# Patient Record
Sex: FEMALE | Race: WHITE
[De-identification: names, ages, dates, MRNs, and addresses within clinical notes are randomized per-mention and may not be internally consistent; named-entity substitution may affect disease eponyms.]

---

## 2019-10-30 ENCOUNTER — HOSPITAL ENCOUNTER (INPATIENT)
Dept: HOSPITAL 8 - ORIP | Age: 64
LOS: 2 days | Discharge: HOME | DRG: 982 | End: 2019-11-01
Attending: SURGERY | Admitting: SURGERY
Payer: MEDICARE

## 2019-10-30 VITALS — DIASTOLIC BLOOD PRESSURE: 79 MMHG | SYSTOLIC BLOOD PRESSURE: 129 MMHG

## 2019-10-30 VITALS — SYSTOLIC BLOOD PRESSURE: 116 MMHG | DIASTOLIC BLOOD PRESSURE: 56 MMHG

## 2019-10-30 VITALS — BODY MASS INDEX: 31.38 KG/M2 | WEIGHT: 159.84 LBS | HEIGHT: 60 IN

## 2019-10-30 VITALS — DIASTOLIC BLOOD PRESSURE: 59 MMHG | SYSTOLIC BLOOD PRESSURE: 122 MMHG

## 2019-10-30 DIAGNOSIS — M31.30: Primary | ICD-10-CM

## 2019-10-30 DIAGNOSIS — N17.9: ICD-10-CM

## 2019-10-30 DIAGNOSIS — K44.9: ICD-10-CM

## 2019-10-30 DIAGNOSIS — K21.9: ICD-10-CM

## 2019-10-30 LAB
ALBUMIN SERPL-MCNC: 4.4 G/DL (ref 3.4–5)
ALP SERPL-CCNC: 85 U/L (ref 45–117)
ALT SERPL-CCNC: 20 U/L (ref 12–78)
ANION GAP SERPL CALC-SCNC: 11 MMOL/L (ref 5–15)
BASOPHILS # BLD AUTO: 0.03 X10^3/UL (ref 0–0.1)
BASOPHILS NFR BLD AUTO: 0 % (ref 0–1)
BILIRUB SERPL-MCNC: 0.4 MG/DL (ref 0.2–1)
CALCIUM SERPL-MCNC: 9 MG/DL (ref 8.5–10.1)
CHLORIDE SERPL-SCNC: 107 MMOL/L (ref 98–107)
CREAT SERPL-MCNC: 1.21 MG/DL (ref 0.55–1.02)
EOSINOPHIL # BLD AUTO: 0.16 X10^3/UL (ref 0–0.4)
EOSINOPHIL NFR BLD AUTO: 2 % (ref 1–7)
ERYTHROCYTE [DISTWIDTH] IN BLOOD BY AUTOMATED COUNT: 12.7 % (ref 9.6–15.2)
LYMPHOCYTES # BLD AUTO: 1.92 X10^3/UL (ref 1–3.4)
LYMPHOCYTES NFR BLD AUTO: 21 % (ref 22–44)
MCH RBC QN AUTO: 30.3 PG (ref 27–34.8)
MCHC RBC AUTO-ENTMCNC: 33.3 G/DL (ref 32.4–35.8)
MCV RBC AUTO: 90.9 FL (ref 80–100)
MD: NO
MONOCYTES # BLD AUTO: 0.52 X10^3/UL (ref 0.2–0.8)
MONOCYTES NFR BLD AUTO: 6 % (ref 2–9)
NEUTROPHILS # BLD AUTO: 6.36 X10^3/UL (ref 1.8–6.8)
NEUTROPHILS NFR BLD AUTO: 71 % (ref 42–75)
PLATELET # BLD AUTO: 275 X10^3/UL (ref 130–400)
PMV BLD AUTO: 8.8 FL (ref 7.4–10.4)
PROT SERPL-MCNC: 8 G/DL (ref 6.4–8.2)
RBC # BLD AUTO: 4.94 X10^6/UL (ref 3.82–5.3)

## 2019-10-30 PROCEDURE — 0BBF4ZZ EXCISION OF RIGHT LOWER LUNG LOBE, PERCUTANEOUS ENDOSCOPIC APPROACH: ICD-10-PCS | Performed by: SURGERY

## 2019-10-30 PROCEDURE — 86850 RBC ANTIBODY SCREEN: CPT

## 2019-10-30 PROCEDURE — 71045 X-RAY EXAM CHEST 1 VIEW: CPT

## 2019-10-30 PROCEDURE — 88331 PATH CONSLTJ SURG 1 BLK 1SPC: CPT

## 2019-10-30 PROCEDURE — 88307 TISSUE EXAM BY PATHOLOGIST: CPT

## 2019-10-30 PROCEDURE — 80053 COMPREHEN METABOLIC PANEL: CPT

## 2019-10-30 PROCEDURE — C1729 CATH, DRAINAGE: HCPCS

## 2019-10-30 PROCEDURE — C1760 CLOSURE DEV, VASC: HCPCS

## 2019-10-30 PROCEDURE — 93005 ELECTROCARDIOGRAM TRACING: CPT

## 2019-10-30 PROCEDURE — 36415 COLL VENOUS BLD VENIPUNCTURE: CPT

## 2019-10-30 PROCEDURE — 88312 SPECIAL STAINS GROUP 1: CPT

## 2019-10-30 PROCEDURE — 86900 BLOOD TYPING SEROLOGIC ABO: CPT

## 2019-10-30 PROCEDURE — 85025 COMPLETE CBC W/AUTO DIFF WBC: CPT

## 2019-10-30 RX ADMIN — KETOROLAC TROMETHAMINE PRN MG: 30 INJECTION, SOLUTION INTRAMUSCULAR at 19:31

## 2019-10-30 RX ADMIN — INSULIN LISPRO SCH NOTE: 100 INJECTION, SOLUTION INTRAVENOUS; SUBCUTANEOUS at 19:00

## 2019-10-30 RX ADMIN — SODIUM CHLORIDE, PRESERVATIVE FREE SCH ML: 5 INJECTION INTRAVENOUS at 19:37

## 2019-10-30 RX ADMIN — ONDANSETRON PRN MG: 2 INJECTION, SOLUTION INTRAMUSCULAR; INTRAVENOUS at 20:04

## 2019-10-30 RX ADMIN — FENTANYL CITRATE PRN MCG: 50 INJECTION INTRAMUSCULAR; INTRAVENOUS at 16:27

## 2019-10-30 RX ADMIN — ACETAMINOPHEN SCH MG: 500 TABLET, COATED ORAL at 19:32

## 2019-10-30 RX ADMIN — FENTANYL CITRATE PRN MCG: 50 INJECTION INTRAMUSCULAR; INTRAVENOUS at 16:41

## 2019-10-31 VITALS — SYSTOLIC BLOOD PRESSURE: 115 MMHG | DIASTOLIC BLOOD PRESSURE: 56 MMHG

## 2019-10-31 VITALS — DIASTOLIC BLOOD PRESSURE: 64 MMHG | SYSTOLIC BLOOD PRESSURE: 114 MMHG

## 2019-10-31 VITALS — SYSTOLIC BLOOD PRESSURE: 107 MMHG | DIASTOLIC BLOOD PRESSURE: 52 MMHG

## 2019-10-31 VITALS — SYSTOLIC BLOOD PRESSURE: 120 MMHG | DIASTOLIC BLOOD PRESSURE: 46 MMHG

## 2019-10-31 RX ADMIN — INSULIN LISPRO SCH NOTE: 100 INJECTION, SOLUTION INTRAVENOUS; SUBCUTANEOUS at 08:23

## 2019-10-31 RX ADMIN — SODIUM CHLORIDE, PRESERVATIVE FREE SCH ML: 5 INJECTION INTRAVENOUS at 07:59

## 2019-10-31 RX ADMIN — DEXTROSE, SODIUM CHLORIDE, AND POTASSIUM CHLORIDE SCH MLS/HR: 5; .45; .15 INJECTION INTRAVENOUS at 10:11

## 2019-10-31 RX ADMIN — ACETAMINOPHEN SCH MG: 500 TABLET, COATED ORAL at 02:03

## 2019-10-31 RX ADMIN — OXYCODONE HYDROCHLORIDE PRN MG: 5 SOLUTION ORAL at 12:48

## 2019-10-31 RX ADMIN — OXYCODONE HYDROCHLORIDE PRN MG: 5 SOLUTION ORAL at 23:03

## 2019-10-31 RX ADMIN — ACETAMINOPHEN SCH MG: 500 TABLET, COATED ORAL at 20:15

## 2019-10-31 RX ADMIN — ONDANSETRON PRN MG: 2 INJECTION, SOLUTION INTRAMUSCULAR; INTRAVENOUS at 08:10

## 2019-10-31 RX ADMIN — INSULIN LISPRO SCH NOTE: 100 INJECTION, SOLUTION INTRAVENOUS; SUBCUTANEOUS at 03:00

## 2019-10-31 RX ADMIN — OMEPRAZOLE SCH MG: 20 CAPSULE, DELAYED RELEASE ORAL at 05:10

## 2019-10-31 RX ADMIN — SODIUM CHLORIDE, PRESERVATIVE FREE SCH ML: 5 INJECTION INTRAVENOUS at 20:16

## 2019-10-31 RX ADMIN — ACETAMINOPHEN SCH MG: 500 TABLET, COATED ORAL at 14:41

## 2019-10-31 RX ADMIN — OXYCODONE HYDROCHLORIDE PRN MG: 5 SOLUTION ORAL at 17:03

## 2019-10-31 RX ADMIN — ENOXAPARIN SODIUM SCH MG: 40 INJECTION SUBCUTANEOUS at 07:57

## 2019-10-31 RX ADMIN — ONDANSETRON PRN MG: 2 INJECTION, SOLUTION INTRAMUSCULAR; INTRAVENOUS at 23:03

## 2019-10-31 RX ADMIN — KETOROLAC TROMETHAMINE PRN MG: 30 INJECTION, SOLUTION INTRAMUSCULAR at 08:10

## 2019-10-31 RX ADMIN — ONDANSETRON PRN MG: 2 INJECTION, SOLUTION INTRAMUSCULAR; INTRAVENOUS at 17:02

## 2019-10-31 RX ADMIN — DEXTROSE, SODIUM CHLORIDE, AND POTASSIUM CHLORIDE SCH MLS/HR: 5; .45; .15 INJECTION INTRAVENOUS at 22:59

## 2019-10-31 RX ADMIN — ACETAMINOPHEN SCH MG: 500 TABLET, COATED ORAL at 07:58

## 2019-11-01 VITALS — DIASTOLIC BLOOD PRESSURE: 51 MMHG | SYSTOLIC BLOOD PRESSURE: 110 MMHG

## 2019-11-01 VITALS — SYSTOLIC BLOOD PRESSURE: 109 MMHG | DIASTOLIC BLOOD PRESSURE: 48 MMHG

## 2019-11-01 RX ADMIN — ENOXAPARIN SODIUM SCH MG: 40 INJECTION SUBCUTANEOUS at 08:00

## 2019-11-01 RX ADMIN — ACETAMINOPHEN SCH MG: 500 TABLET, COATED ORAL at 02:00

## 2019-11-01 RX ADMIN — OMEPRAZOLE SCH MG: 20 CAPSULE, DELAYED RELEASE ORAL at 05:05

## 2019-11-01 RX ADMIN — ACETAMINOPHEN SCH MG: 500 TABLET, COATED ORAL at 05:05

## 2019-12-16 ENCOUNTER — HOSPITAL ENCOUNTER (INPATIENT)
Dept: HOSPITAL 8 - OUT | Age: 64
LOS: 2 days | Discharge: HOME | DRG: 328 | End: 2019-12-18
Attending: SURGERY | Admitting: SURGERY
Payer: MEDICARE

## 2019-12-16 VITALS — SYSTOLIC BLOOD PRESSURE: 140 MMHG | DIASTOLIC BLOOD PRESSURE: 78 MMHG

## 2019-12-16 VITALS — BODY MASS INDEX: 32.16 KG/M2 | HEIGHT: 60 IN | WEIGHT: 163.8 LBS

## 2019-12-16 VITALS — SYSTOLIC BLOOD PRESSURE: 127 MMHG | DIASTOLIC BLOOD PRESSURE: 65 MMHG

## 2019-12-16 VITALS — DIASTOLIC BLOOD PRESSURE: 67 MMHG | SYSTOLIC BLOOD PRESSURE: 147 MMHG

## 2019-12-16 DIAGNOSIS — R13.10: ICD-10-CM

## 2019-12-16 DIAGNOSIS — E78.5: ICD-10-CM

## 2019-12-16 DIAGNOSIS — K21.9: ICD-10-CM

## 2019-12-16 DIAGNOSIS — G43.909: ICD-10-CM

## 2019-12-16 DIAGNOSIS — K44.9: Primary | ICD-10-CM

## 2019-12-16 PROCEDURE — C1781 MESH (IMPLANTABLE): HCPCS

## 2019-12-16 PROCEDURE — 0DV44ZZ RESTRICTION OF ESOPHAGOGASTRIC JUNCTION, PERCUTANEOUS ENDOSCOPIC APPROACH: ICD-10-PCS | Performed by: SURGERY

## 2019-12-16 PROCEDURE — 0BUT4JZ SUPPLEMENT DIAPHRAGM WITH SYNTHETIC SUBSTITUTE, PERCUTANEOUS ENDOSCOPIC APPROACH: ICD-10-PCS | Performed by: SURGERY

## 2019-12-16 RX ADMIN — FENTANYL CITRATE PRN MCG: 50 INJECTION INTRAMUSCULAR; INTRAVENOUS at 15:32

## 2019-12-16 RX ADMIN — FENTANYL CITRATE PRN MCG: 50 INJECTION INTRAMUSCULAR; INTRAVENOUS at 15:44

## 2019-12-16 RX ADMIN — OXYCODONE HYDROCHLORIDE PRN MG: 5 SOLUTION ORAL at 18:38

## 2019-12-16 RX ADMIN — INSULIN LISPRO SCH NOTE: 100 INJECTION, SOLUTION INTRAVENOUS; SUBCUTANEOUS at 19:23

## 2019-12-16 RX ADMIN — OXYCODONE HYDROCHLORIDE PRN MG: 5 SOLUTION ORAL at 15:48

## 2019-12-16 RX ADMIN — ATORVASTATIN CALCIUM SCH MG: 20 TABLET, FILM COATED ORAL at 21:10

## 2019-12-16 RX ADMIN — ACETAMINOPHEN SCH MG: 160 SOLUTION ORAL at 18:37

## 2019-12-16 RX ADMIN — SODIUM CHLORIDE, SODIUM LACTATE, POTASSIUM CHLORIDE, AND CALCIUM CHLORIDE SCH MLS/HR: .6; .31; .03; .02 INJECTION, SOLUTION INTRAVENOUS at 22:30

## 2019-12-17 VITALS — SYSTOLIC BLOOD PRESSURE: 141 MMHG | DIASTOLIC BLOOD PRESSURE: 79 MMHG

## 2019-12-17 VITALS — SYSTOLIC BLOOD PRESSURE: 114 MMHG | DIASTOLIC BLOOD PRESSURE: 61 MMHG

## 2019-12-17 VITALS — DIASTOLIC BLOOD PRESSURE: 78 MMHG | SYSTOLIC BLOOD PRESSURE: 144 MMHG

## 2019-12-17 VITALS — DIASTOLIC BLOOD PRESSURE: 71 MMHG | SYSTOLIC BLOOD PRESSURE: 149 MMHG

## 2019-12-17 RX ADMIN — KETOROLAC TROMETHAMINE PRN MG: 30 INJECTION, SOLUTION INTRAMUSCULAR at 17:27

## 2019-12-17 RX ADMIN — ONDANSETRON PRN MG: 2 INJECTION, SOLUTION INTRAMUSCULAR; INTRAVENOUS at 12:28

## 2019-12-17 RX ADMIN — DEXTROSE, SODIUM CHLORIDE, AND POTASSIUM CHLORIDE SCH MLS/HR: 5; .45; .15 INJECTION INTRAVENOUS at 09:46

## 2019-12-17 RX ADMIN — ACETAMINOPHEN SCH MG: 160 SOLUTION ORAL at 06:00

## 2019-12-17 RX ADMIN — ONDANSETRON PRN MG: 2 INJECTION, SOLUTION INTRAMUSCULAR; INTRAVENOUS at 06:41

## 2019-12-17 RX ADMIN — INSULIN LISPRO SCH NOTE: 100 INJECTION, SOLUTION INTRAVENOUS; SUBCUTANEOUS at 02:00

## 2019-12-17 RX ADMIN — ACETAMINOPHEN SCH MG: 160 SOLUTION ORAL at 00:55

## 2019-12-17 RX ADMIN — SODIUM CHLORIDE, SODIUM LACTATE, POTASSIUM CHLORIDE, AND CALCIUM CHLORIDE SCH MLS/HR: .6; .31; .03; .02 INJECTION, SOLUTION INTRAVENOUS at 10:48

## 2019-12-17 RX ADMIN — KETOROLAC TROMETHAMINE PRN MG: 30 INJECTION, SOLUTION INTRAMUSCULAR at 06:41

## 2019-12-17 RX ADMIN — ACETAMINOPHEN SCH MG: 160 SOLUTION ORAL at 17:23

## 2019-12-17 RX ADMIN — Medication SCH MG: at 08:00

## 2019-12-17 RX ADMIN — ACETAMINOPHEN SCH MG: 160 SOLUTION ORAL at 12:28

## 2019-12-17 RX ADMIN — ATORVASTATIN CALCIUM SCH MG: 20 TABLET, FILM COATED ORAL at 21:00

## 2019-12-17 RX ADMIN — DEXTROSE, SODIUM CHLORIDE, AND POTASSIUM CHLORIDE SCH MLS/HR: 5; .45; .15 INJECTION INTRAVENOUS at 22:55

## 2019-12-18 VITALS — DIASTOLIC BLOOD PRESSURE: 59 MMHG | SYSTOLIC BLOOD PRESSURE: 126 MMHG

## 2019-12-18 VITALS — DIASTOLIC BLOOD PRESSURE: 79 MMHG | SYSTOLIC BLOOD PRESSURE: 137 MMHG

## 2019-12-18 RX ADMIN — ACETAMINOPHEN SCH MG: 160 SOLUTION ORAL at 06:00

## 2019-12-18 RX ADMIN — ACETAMINOPHEN SCH MG: 160 SOLUTION ORAL at 00:00

## 2019-12-18 RX ADMIN — SODIUM CHLORIDE, SODIUM LACTATE, POTASSIUM CHLORIDE, AND CALCIUM CHLORIDE SCH MLS/HR: .6; .31; .03; .02 INJECTION, SOLUTION INTRAVENOUS at 01:10

## 2019-12-18 RX ADMIN — KETOROLAC TROMETHAMINE PRN MG: 30 INJECTION, SOLUTION INTRAMUSCULAR at 08:07

## 2019-12-18 RX ADMIN — Medication SCH MG: at 08:07

## 2019-12-18 RX ADMIN — KETOROLAC TROMETHAMINE PRN MG: 30 INJECTION, SOLUTION INTRAMUSCULAR at 00:47

## 2020-02-13 ENCOUNTER — HOSPITAL ENCOUNTER (OUTPATIENT)
Dept: HOSPITAL 8 - RAD | Age: 65
Discharge: HOME | End: 2020-02-13
Attending: NURSE PRACTITIONER
Payer: MEDICARE

## 2020-02-13 DIAGNOSIS — R14.0: ICD-10-CM

## 2020-02-13 DIAGNOSIS — R14.1: ICD-10-CM

## 2020-02-13 DIAGNOSIS — R19.7: ICD-10-CM

## 2020-02-13 DIAGNOSIS — K22.8: Primary | ICD-10-CM

## 2020-02-13 DIAGNOSIS — K44.9: ICD-10-CM

## 2020-02-13 DIAGNOSIS — R13.19: ICD-10-CM

## 2020-02-13 PROCEDURE — 74240 X-RAY XM UPR GI TRC 1CNTRST: CPT

## 2020-02-13 PROCEDURE — 74248 X-RAY SM INT F-THRU STD: CPT

## 2020-04-13 ENCOUNTER — HOSPITAL ENCOUNTER (OUTPATIENT)
Dept: HOSPITAL 8 - OUT | Age: 65
Discharge: HOME | End: 2020-04-13
Attending: SURGERY
Payer: MEDICARE

## 2020-04-13 VITALS — BODY MASS INDEX: 28.96 KG/M2 | WEIGHT: 147.49 LBS | HEIGHT: 60 IN

## 2020-04-13 VITALS — DIASTOLIC BLOOD PRESSURE: 54 MMHG | SYSTOLIC BLOOD PRESSURE: 106 MMHG

## 2020-04-13 DIAGNOSIS — Z82.3: ICD-10-CM

## 2020-04-13 DIAGNOSIS — Z79.899: ICD-10-CM

## 2020-04-13 DIAGNOSIS — Z82.49: ICD-10-CM

## 2020-04-13 DIAGNOSIS — Z98.890: ICD-10-CM

## 2020-04-13 DIAGNOSIS — Z90.49: ICD-10-CM

## 2020-04-13 DIAGNOSIS — E78.5: ICD-10-CM

## 2020-04-13 DIAGNOSIS — R13.10: Primary | ICD-10-CM

## 2020-04-13 PROCEDURE — 93005 ELECTROCARDIOGRAM TRACING: CPT

## 2020-04-13 PROCEDURE — C1725 CATH, TRANSLUMIN NON-LASER: HCPCS

## 2020-04-13 PROCEDURE — 43249 ESOPH EGD DILATION <30 MM: CPT

## 2020-04-22 ENCOUNTER — HOSPITAL ENCOUNTER (OUTPATIENT)
Dept: HOSPITAL 8 - OUT | Age: 65
Discharge: HOME | End: 2020-04-22
Attending: SURGERY
Payer: MEDICARE

## 2020-04-22 VITALS — DIASTOLIC BLOOD PRESSURE: 79 MMHG | SYSTOLIC BLOOD PRESSURE: 120 MMHG

## 2020-04-22 VITALS — WEIGHT: 145.95 LBS | HEIGHT: 61 IN | BODY MASS INDEX: 27.55 KG/M2

## 2020-04-22 DIAGNOSIS — H81.09: ICD-10-CM

## 2020-04-22 DIAGNOSIS — Z98.890: ICD-10-CM

## 2020-04-22 DIAGNOSIS — R13.10: Primary | ICD-10-CM

## 2020-04-22 DIAGNOSIS — K21.9: ICD-10-CM

## 2020-04-22 DIAGNOSIS — E78.5: ICD-10-CM

## 2020-04-22 PROCEDURE — 43249 ESOPH EGD DILATION <30 MM: CPT

## 2020-04-22 PROCEDURE — C1725 CATH, TRANSLUMIN NON-LASER: HCPCS

## 2020-05-01 ENCOUNTER — HOSPITAL ENCOUNTER (OUTPATIENT)
Dept: HOSPITAL 8 - OUT | Age: 65
Discharge: HOME | End: 2020-05-01
Attending: SURGERY
Payer: MEDICARE

## 2020-05-01 VITALS — SYSTOLIC BLOOD PRESSURE: 105 MMHG | DIASTOLIC BLOOD PRESSURE: 63 MMHG

## 2020-05-01 VITALS — WEIGHT: 144.18 LBS | BODY MASS INDEX: 27.22 KG/M2 | HEIGHT: 61 IN

## 2020-05-01 VITALS — DIASTOLIC BLOOD PRESSURE: 63 MMHG | SYSTOLIC BLOOD PRESSURE: 105 MMHG

## 2020-05-01 DIAGNOSIS — Z79.899: ICD-10-CM

## 2020-05-01 DIAGNOSIS — E78.5: ICD-10-CM

## 2020-05-01 DIAGNOSIS — R13.10: Primary | ICD-10-CM

## 2020-05-01 DIAGNOSIS — Z98.890: ICD-10-CM

## 2020-05-01 DIAGNOSIS — Z88.5: ICD-10-CM

## 2020-05-01 PROCEDURE — 43249 ESOPH EGD DILATION <30 MM: CPT

## 2020-07-01 ENCOUNTER — HOSPITAL ENCOUNTER (OUTPATIENT)
Dept: HOSPITAL 8 - CFH | Age: 65
Discharge: HOME | End: 2020-07-01
Attending: SURGERY
Payer: MEDICARE

## 2020-07-01 DIAGNOSIS — R13.10: Primary | ICD-10-CM

## 2020-07-01 PROCEDURE — 74240 X-RAY XM UPR GI TRC 1CNTRST: CPT

## 2020-07-01 PROCEDURE — 76700 US EXAM ABDOM COMPLETE: CPT

## 2021-06-18 ENCOUNTER — HOSPITAL ENCOUNTER (INPATIENT)
Dept: HOSPITAL 8 - ED | Age: 66
LOS: 3 days | Discharge: HOME | DRG: 392 | End: 2021-06-21
Attending: HOSPITALIST | Admitting: HOSPITALIST
Payer: MEDICARE

## 2021-06-18 ENCOUNTER — HOSPITAL ENCOUNTER (OUTPATIENT)
Dept: HOSPITAL 8 - CFH | Age: 66
Discharge: HOME | End: 2021-06-18
Attending: INTERNAL MEDICINE
Payer: MEDICARE

## 2021-06-18 VITALS — SYSTOLIC BLOOD PRESSURE: 144 MMHG | DIASTOLIC BLOOD PRESSURE: 82 MMHG

## 2021-06-18 VITALS — BODY MASS INDEX: 28 KG/M2 | HEIGHT: 60 IN | WEIGHT: 142.64 LBS

## 2021-06-18 DIAGNOSIS — F31.9: ICD-10-CM

## 2021-06-18 DIAGNOSIS — R94.31: ICD-10-CM

## 2021-06-18 DIAGNOSIS — J45.909: ICD-10-CM

## 2021-06-18 DIAGNOSIS — Z80.42: ICD-10-CM

## 2021-06-18 DIAGNOSIS — I48.91: ICD-10-CM

## 2021-06-18 DIAGNOSIS — R53.83: ICD-10-CM

## 2021-06-18 DIAGNOSIS — E78.5: ICD-10-CM

## 2021-06-18 DIAGNOSIS — Z88.8: ICD-10-CM

## 2021-06-18 DIAGNOSIS — I48.91: Primary | ICD-10-CM

## 2021-06-18 DIAGNOSIS — R11.2: Primary | ICD-10-CM

## 2021-06-18 DIAGNOSIS — T48.6X5A: ICD-10-CM

## 2021-06-18 DIAGNOSIS — I10: ICD-10-CM

## 2021-06-18 DIAGNOSIS — G43.909: ICD-10-CM

## 2021-06-18 DIAGNOSIS — Z88.5: ICD-10-CM

## 2021-06-18 DIAGNOSIS — H81.09: ICD-10-CM

## 2021-06-18 DIAGNOSIS — Z82.49: ICD-10-CM

## 2021-06-18 LAB
ALBUMIN SERPL-MCNC: 3.7 G/DL (ref 3.4–5)
ALP SERPL-CCNC: 64 U/L (ref 45–117)
ALT SERPL-CCNC: 21 U/L (ref 12–78)
ANION GAP SERPL CALC-SCNC: 10 MMOL/L (ref 5–15)
BASOPHILS # BLD AUTO: 0 X10^3/UL (ref 0–0.1)
BASOPHILS NFR BLD AUTO: 0 % (ref 0–1)
BILIRUB SERPL-MCNC: 0.5 MG/DL (ref 0.2–1)
CALCIUM SERPL-MCNC: 8.1 MG/DL (ref 8.5–10.1)
CHLORIDE SERPL-SCNC: 110 MMOL/L (ref 98–107)
CREAT SERPL-MCNC: 0.59 MG/DL (ref 0.55–1.02)
EOSINOPHIL # BLD AUTO: 0.1 X10^3/UL (ref 0–0.4)
EOSINOPHIL NFR BLD AUTO: 1 % (ref 1–7)
ERYTHROCYTE [DISTWIDTH] IN BLOOD BY AUTOMATED COUNT: 13.7 % (ref 9.6–15.2)
LYMPHOCYTES # BLD AUTO: 1.2 X10^3/UL (ref 1–3.4)
LYMPHOCYTES NFR BLD AUTO: 11 % (ref 22–44)
MCH RBC QN AUTO: 30.3 PG (ref 27–34.8)
MCHC RBC AUTO-ENTMCNC: 33.8 G/DL (ref 32.4–35.8)
MONOCYTES # BLD AUTO: 0.7 X10^3/UL (ref 0.2–0.8)
MONOCYTES NFR BLD AUTO: 6 % (ref 2–9)
NEUTROPHILS # BLD AUTO: 8.9 X10^3/UL (ref 1.8–6.8)
NEUTROPHILS NFR BLD AUTO: 82 % (ref 42–75)
PLATELET # BLD AUTO: 267 X10^3/UL (ref 130–400)
PMV BLD AUTO: 8.8 FL (ref 7.4–10.4)
PROT SERPL-MCNC: 7 G/DL (ref 6.4–8.2)
RBC # BLD AUTO: 4.4 X10^6/UL (ref 3.82–5.3)
TROPONIN I SERPL-MCNC: < 0.015 NG/ML (ref 0–0.04)

## 2021-06-18 PROCEDURE — 93306 TTE W/DOPPLER COMPLETE: CPT

## 2021-06-18 PROCEDURE — A9502 TC99M TETROFOSMIN: HCPCS

## 2021-06-18 PROCEDURE — 93005 ELECTROCARDIOGRAM TRACING: CPT

## 2021-06-18 PROCEDURE — 78452 HT MUSCLE IMAGE SPECT MULT: CPT

## 2021-06-18 PROCEDURE — 84100 ASSAY OF PHOSPHORUS: CPT

## 2021-06-18 PROCEDURE — 80053 COMPREHEN METABOLIC PANEL: CPT

## 2021-06-18 PROCEDURE — 83690 ASSAY OF LIPASE: CPT

## 2021-06-18 PROCEDURE — 85025 COMPLETE CBC W/AUTO DIFF WBC: CPT

## 2021-06-18 PROCEDURE — 83735 ASSAY OF MAGNESIUM: CPT

## 2021-06-18 PROCEDURE — 96374 THER/PROPH/DIAG INJ IV PUSH: CPT

## 2021-06-18 PROCEDURE — 96361 HYDRATE IV INFUSION ADD-ON: CPT

## 2021-06-18 PROCEDURE — 36415 COLL VENOUS BLD VENIPUNCTURE: CPT

## 2021-06-18 PROCEDURE — G0378 HOSPITAL OBSERVATION PER HR: HCPCS

## 2021-06-18 PROCEDURE — 93017 CV STRESS TEST TRACING ONLY: CPT

## 2021-06-18 PROCEDURE — 84484 ASSAY OF TROPONIN QUANT: CPT

## 2021-06-18 PROCEDURE — 80048 BASIC METABOLIC PNL TOTAL CA: CPT

## 2021-06-18 RX ADMIN — MOXIFLOXACIN HYDROCHLORIDE SCH DROP: 5 SOLUTION/ DROPS OPHTHALMIC at 23:34

## 2021-06-18 RX ADMIN — NEOMYCIN AND POLYMYXIN B SULFATES AND DEXAMETHASONE SCH APPLIC: 3.5; 10000; 1 OINTMENT OPHTHALMIC at 23:34

## 2021-06-18 RX ADMIN — BUDESONIDE AND FORMOTEROL FUMARATE DIHYDRATE SCH DROP: 160; 4.5 AEROSOL RESPIRATORY (INHALATION) at 23:33

## 2021-06-18 RX ADMIN — SODIUM CHLORIDE SCH MLS/HR: 0.9 INJECTION, SOLUTION INTRAVENOUS at 23:25

## 2021-06-18 NOTE — NUR
PATIENT BIB EMS FROM CARDIOLOGIST OFFICE WITH CHIEF C/O POSSIBLE ALLERGIC 
REACTION TO LEXISCAN.  PER EMS PATIENT WAS HAVING STRESS TEST DONE AND AFTER 
INJECTION OF MEDICATION AND START OF STRESS TEST PATIENT BECAME TACYPNEIC, 
NAUSEATED AND DIZZY.  REVERSAL AGENT GIVEN WITH NO IMPROVEMENT.  PATIENT GIVEN 
4 MG ZOFRAN EN ROUTE, NO IMPROVEMENT. VSS PER EMS. 



PATIENT ACTIVELY VOMITING UPON ARRIVAL, 4-5 EPISODES OF EMESIS, SMALL AMOUNTS 
OF BILE.  PATIENT C/O DIZZINESS, CONNECTED TO MONITOR, VSS, SPOUSE AT BEDSIDE, 
CALL LIGHT WITHIN REACH.

## 2021-06-18 NOTE — NUR
PATIENT MEDICATED PER [facility], CONNECTED TO MONITOR, VSS, SPOUSE AT BEDSIDE.

PATIENT REQUESTING TO USE BEDPAN TO URINATE, PLACED BEDPAN INTO POSITION AND 
TOLD PATIENT TO CALL WHEN DONE.

## 2021-06-18 NOTE — NUR
PT GOT UP AND AMBULATED TO THE BATHROOM, AND DID OKAY.  PT MILDLY UNSTEADY, BUT 
STATES THAT SHE DOESN'T FEEL AS SHORT OF BREATH FROM GOING TO THE BATHROOM AS 
THE FIRST TIME EARLIER IN THE ER.  PT BACK TO ROOM, AND ON CR MONITOR, LIGHTS 
TURNED DOWN AND PT IN POSITION OF COMFORT, AND NO ACUTE DISTRESS.  PT TO BE 
ADMITTED.

## 2021-06-19 VITALS — SYSTOLIC BLOOD PRESSURE: 172 MMHG | DIASTOLIC BLOOD PRESSURE: 99 MMHG

## 2021-06-19 VITALS — DIASTOLIC BLOOD PRESSURE: 82 MMHG | SYSTOLIC BLOOD PRESSURE: 157 MMHG

## 2021-06-19 VITALS — DIASTOLIC BLOOD PRESSURE: 67 MMHG | SYSTOLIC BLOOD PRESSURE: 121 MMHG

## 2021-06-19 VITALS — SYSTOLIC BLOOD PRESSURE: 140 MMHG | DIASTOLIC BLOOD PRESSURE: 81 MMHG

## 2021-06-19 VITALS — SYSTOLIC BLOOD PRESSURE: 137 MMHG | DIASTOLIC BLOOD PRESSURE: 72 MMHG

## 2021-06-19 VITALS — SYSTOLIC BLOOD PRESSURE: 150 MMHG | DIASTOLIC BLOOD PRESSURE: 81 MMHG

## 2021-06-19 VITALS — SYSTOLIC BLOOD PRESSURE: 143 MMHG | DIASTOLIC BLOOD PRESSURE: 76 MMHG

## 2021-06-19 RX ADMIN — SODIUM CHLORIDE SCH MLS/HR: 0.9 INJECTION, SOLUTION INTRAVENOUS at 13:54

## 2021-06-19 RX ADMIN — MOXIFLOXACIN HYDROCHLORIDE SCH DROP: 5 SOLUTION/ DROPS OPHTHALMIC at 16:00

## 2021-06-19 RX ADMIN — BUDESONIDE AND FORMOTEROL FUMARATE DIHYDRATE SCH DROP: 160; 4.5 AEROSOL RESPIRATORY (INHALATION) at 21:00

## 2021-06-19 RX ADMIN — BUDESONIDE AND FORMOTEROL FUMARATE DIHYDRATE SCH DROP: 160; 4.5 AEROSOL RESPIRATORY (INHALATION) at 09:00

## 2021-06-19 RX ADMIN — OMEPRAZOLE SCH MG: 20 CAPSULE, DELAYED RELEASE ORAL at 06:48

## 2021-06-19 RX ADMIN — MOXIFLOXACIN HYDROCHLORIDE SCH DROP: 5 SOLUTION/ DROPS OPHTHALMIC at 09:00

## 2021-06-19 RX ADMIN — NEOMYCIN AND POLYMYXIN B SULFATES AND DEXAMETHASONE SCH APPLIC: 3.5; 10000; 1 OINTMENT OPHTHALMIC at 09:00

## 2021-06-19 RX ADMIN — NEOMYCIN AND POLYMYXIN B SULFATES AND DEXAMETHASONE SCH APPLIC: 3.5; 10000; 1 OINTMENT OPHTHALMIC at 21:00

## 2021-06-19 RX ADMIN — MOXIFLOXACIN HYDROCHLORIDE SCH DROP: 5 SOLUTION/ DROPS OPHTHALMIC at 21:00

## 2021-06-19 RX ADMIN — BUDESONIDE AND FORMOTEROL FUMARATE DIHYDRATE SCH DROP: 160; 4.5 AEROSOL RESPIRATORY (INHALATION) at 16:00

## 2021-06-20 VITALS — DIASTOLIC BLOOD PRESSURE: 79 MMHG | SYSTOLIC BLOOD PRESSURE: 133 MMHG

## 2021-06-20 VITALS — SYSTOLIC BLOOD PRESSURE: 164 MMHG | DIASTOLIC BLOOD PRESSURE: 92 MMHG

## 2021-06-20 VITALS — DIASTOLIC BLOOD PRESSURE: 102 MMHG | SYSTOLIC BLOOD PRESSURE: 155 MMHG

## 2021-06-20 VITALS — DIASTOLIC BLOOD PRESSURE: 68 MMHG | SYSTOLIC BLOOD PRESSURE: 114 MMHG

## 2021-06-20 VITALS — SYSTOLIC BLOOD PRESSURE: 140 MMHG | DIASTOLIC BLOOD PRESSURE: 86 MMHG

## 2021-06-20 VITALS — SYSTOLIC BLOOD PRESSURE: 129 MMHG | DIASTOLIC BLOOD PRESSURE: 80 MMHG

## 2021-06-20 LAB
ANION GAP SERPL CALC-SCNC: 9 MMOL/L (ref 5–15)
BASOPHILS # BLD AUTO: 0.1 X10^3/UL (ref 0–0.1)
BASOPHILS NFR BLD AUTO: 1 % (ref 0–1)
CALCIUM SERPL-MCNC: 8.1 MG/DL (ref 8.5–10.1)
CHLORIDE SERPL-SCNC: 113 MMOL/L (ref 98–107)
CREAT SERPL-MCNC: 0.63 MG/DL (ref 0.55–1.02)
EOSINOPHIL # BLD AUTO: 0.2 X10^3/UL (ref 0–0.4)
EOSINOPHIL NFR BLD AUTO: 3 % (ref 1–7)
ERYTHROCYTE [DISTWIDTH] IN BLOOD BY AUTOMATED COUNT: 13.8 % (ref 9.6–15.2)
LYMPHOCYTES # BLD AUTO: 1.8 X10^3/UL (ref 1–3.4)
LYMPHOCYTES NFR BLD AUTO: 26 % (ref 22–44)
MCH RBC QN AUTO: 30.5 PG (ref 27–34.8)
MCHC RBC AUTO-ENTMCNC: 34.1 G/DL (ref 32.4–35.8)
MONOCYTES # BLD AUTO: 0.6 X10^3/UL (ref 0.2–0.8)
MONOCYTES NFR BLD AUTO: 8 % (ref 2–9)
NEUTROPHILS # BLD AUTO: 4.4 X10^3/UL (ref 1.8–6.8)
NEUTROPHILS NFR BLD AUTO: 62 % (ref 42–75)
PLATELET # BLD AUTO: 277 X10^3/UL (ref 130–400)
PMV BLD AUTO: 8.3 FL (ref 7.4–10.4)
RBC # BLD AUTO: 4.42 X10^6/UL (ref 3.82–5.3)

## 2021-06-20 RX ADMIN — NEOMYCIN AND POLYMYXIN B SULFATES AND DEXAMETHASONE SCH APPLIC: 3.5; 10000; 1 OINTMENT OPHTHALMIC at 07:41

## 2021-06-20 RX ADMIN — MOXIFLOXACIN HYDROCHLORIDE SCH DROP: 5 SOLUTION/ DROPS OPHTHALMIC at 07:41

## 2021-06-20 RX ADMIN — BUDESONIDE AND FORMOTEROL FUMARATE DIHYDRATE SCH DROP: 160; 4.5 AEROSOL RESPIRATORY (INHALATION) at 21:00

## 2021-06-20 RX ADMIN — MOXIFLOXACIN HYDROCHLORIDE SCH DROP: 5 SOLUTION/ DROPS OPHTHALMIC at 16:02

## 2021-06-20 RX ADMIN — OMEPRAZOLE SCH MG: 20 CAPSULE, DELAYED RELEASE ORAL at 10:42

## 2021-06-20 RX ADMIN — BUDESONIDE AND FORMOTEROL FUMARATE DIHYDRATE SCH DROP: 160; 4.5 AEROSOL RESPIRATORY (INHALATION) at 16:02

## 2021-06-20 RX ADMIN — SODIUM CHLORIDE SCH MLS/HR: 0.9 INJECTION, SOLUTION INTRAVENOUS at 02:10

## 2021-06-20 RX ADMIN — SODIUM CHLORIDE SCH MLS/HR: 0.9 INJECTION, SOLUTION INTRAVENOUS at 13:13

## 2021-06-20 RX ADMIN — NEOMYCIN AND POLYMYXIN B SULFATES AND DEXAMETHASONE SCH APPLIC: 3.5; 10000; 1 OINTMENT OPHTHALMIC at 21:00

## 2021-06-20 RX ADMIN — MOXIFLOXACIN HYDROCHLORIDE SCH DROP: 5 SOLUTION/ DROPS OPHTHALMIC at 21:00

## 2021-06-20 RX ADMIN — BUDESONIDE AND FORMOTEROL FUMARATE DIHYDRATE SCH DROP: 160; 4.5 AEROSOL RESPIRATORY (INHALATION) at 07:41

## 2021-06-21 VITALS — SYSTOLIC BLOOD PRESSURE: 132 MMHG | DIASTOLIC BLOOD PRESSURE: 72 MMHG

## 2021-06-21 VITALS — SYSTOLIC BLOOD PRESSURE: 146 MMHG | DIASTOLIC BLOOD PRESSURE: 75 MMHG

## 2021-06-21 VITALS — DIASTOLIC BLOOD PRESSURE: 74 MMHG | SYSTOLIC BLOOD PRESSURE: 154 MMHG

## 2021-06-21 RX ADMIN — NEOMYCIN AND POLYMYXIN B SULFATES AND DEXAMETHASONE SCH APPLIC: 3.5; 10000; 1 OINTMENT OPHTHALMIC at 08:33

## 2021-06-21 RX ADMIN — SODIUM CHLORIDE SCH MLS/HR: 0.9 INJECTION, SOLUTION INTRAVENOUS at 02:33

## 2021-06-21 RX ADMIN — OMEPRAZOLE SCH MG: 20 CAPSULE, DELAYED RELEASE ORAL at 05:18

## 2021-06-21 RX ADMIN — MOXIFLOXACIN HYDROCHLORIDE SCH DROP: 5 SOLUTION/ DROPS OPHTHALMIC at 16:00

## 2021-06-21 RX ADMIN — BUDESONIDE AND FORMOTEROL FUMARATE DIHYDRATE SCH DROP: 160; 4.5 AEROSOL RESPIRATORY (INHALATION) at 16:00

## 2021-06-21 RX ADMIN — BUDESONIDE AND FORMOTEROL FUMARATE DIHYDRATE SCH DROP: 160; 4.5 AEROSOL RESPIRATORY (INHALATION) at 08:33

## 2021-06-21 RX ADMIN — MOXIFLOXACIN HYDROCHLORIDE SCH DROP: 5 SOLUTION/ DROPS OPHTHALMIC at 08:34
